# Patient Record
Sex: FEMALE | Race: OTHER | HISPANIC OR LATINO | URBAN - METROPOLITAN AREA
[De-identification: names, ages, dates, MRNs, and addresses within clinical notes are randomized per-mention and may not be internally consistent; named-entity substitution may affect disease eponyms.]

---

## 2018-05-16 ENCOUNTER — OUTPATIENT (OUTPATIENT)
Dept: OUTPATIENT SERVICES | Facility: HOSPITAL | Age: 48
LOS: 1 days | Discharge: HOME | End: 2018-05-16

## 2018-05-16 DIAGNOSIS — Z12.31 ENCOUNTER FOR SCREENING MAMMOGRAM FOR MALIGNANT NEOPLASM OF BREAST: ICD-10-CM

## 2019-07-22 ENCOUNTER — OUTPATIENT (OUTPATIENT)
Dept: OUTPATIENT SERVICES | Facility: HOSPITAL | Age: 49
LOS: 1 days | Discharge: HOME | End: 2019-07-22
Payer: COMMERCIAL

## 2019-07-22 DIAGNOSIS — Z12.31 ENCOUNTER FOR SCREENING MAMMOGRAM FOR MALIGNANT NEOPLASM OF BREAST: ICD-10-CM

## 2019-07-22 PROCEDURE — 77067 SCR MAMMO BI INCL CAD: CPT | Mod: 26

## 2019-07-22 PROCEDURE — 77063 BREAST TOMOSYNTHESIS BI: CPT | Mod: 26

## 2019-07-24 PROBLEM — Z00.00 ENCOUNTER FOR PREVENTIVE HEALTH EXAMINATION: Status: ACTIVE | Noted: 2019-07-24

## 2019-08-01 ENCOUNTER — OUTPATIENT (OUTPATIENT)
Dept: OUTPATIENT SERVICES | Facility: HOSPITAL | Age: 49
LOS: 1 days | Discharge: HOME | End: 2019-08-01

## 2019-08-01 DIAGNOSIS — N97.9 FEMALE INFERTILITY, UNSPECIFIED: ICD-10-CM

## 2020-02-06 ENCOUNTER — INPATIENT (INPATIENT)
Facility: HOSPITAL | Age: 50
LOS: 0 days | Discharge: HOME | End: 2020-02-07
Attending: INTERNAL MEDICINE | Admitting: INTERNAL MEDICINE
Payer: COMMERCIAL

## 2020-02-06 VITALS
RESPIRATION RATE: 18 BRPM | SYSTOLIC BLOOD PRESSURE: 169 MMHG | DIASTOLIC BLOOD PRESSURE: 83 MMHG | HEART RATE: 72 BPM | TEMPERATURE: 98 F | OXYGEN SATURATION: 100 % | WEIGHT: 179.9 LBS

## 2020-02-06 DIAGNOSIS — D25.9 LEIOMYOMA OF UTERUS, UNSPECIFIED: Chronic | ICD-10-CM

## 2020-02-06 DIAGNOSIS — Z90.49 ACQUIRED ABSENCE OF OTHER SPECIFIED PARTS OF DIGESTIVE TRACT: Chronic | ICD-10-CM

## 2020-02-06 LAB
ALBUMIN SERPL ELPH-MCNC: 4.2 G/DL — SIGNIFICANT CHANGE UP (ref 3.5–5.2)
ALP SERPL-CCNC: 85 U/L — SIGNIFICANT CHANGE UP (ref 30–115)
ALT FLD-CCNC: 27 U/L — SIGNIFICANT CHANGE UP (ref 0–41)
ANION GAP SERPL CALC-SCNC: 12 MMOL/L — SIGNIFICANT CHANGE UP (ref 7–14)
APTT BLD: 30.1 SEC — SIGNIFICANT CHANGE UP (ref 27–39.2)
AST SERPL-CCNC: 28 U/L — SIGNIFICANT CHANGE UP (ref 0–41)
BASOPHILS # BLD AUTO: 0.04 K/UL — SIGNIFICANT CHANGE UP (ref 0–0.2)
BASOPHILS NFR BLD AUTO: 0.6 % — SIGNIFICANT CHANGE UP (ref 0–1)
BILIRUB SERPL-MCNC: 0.2 MG/DL — SIGNIFICANT CHANGE UP (ref 0.2–1.2)
BUN SERPL-MCNC: 9 MG/DL — LOW (ref 10–20)
CALCIUM SERPL-MCNC: 9.2 MG/DL — SIGNIFICANT CHANGE UP (ref 8.5–10.1)
CHLORIDE SERPL-SCNC: 108 MMOL/L — SIGNIFICANT CHANGE UP (ref 98–110)
CO2 SERPL-SCNC: 23 MMOL/L — SIGNIFICANT CHANGE UP (ref 17–32)
CREAT SERPL-MCNC: 0.7 MG/DL — SIGNIFICANT CHANGE UP (ref 0.7–1.5)
EOSINOPHIL # BLD AUTO: 0.07 K/UL — SIGNIFICANT CHANGE UP (ref 0–0.7)
EOSINOPHIL NFR BLD AUTO: 1 % — SIGNIFICANT CHANGE UP (ref 0–8)
GLUCOSE SERPL-MCNC: 98 MG/DL — SIGNIFICANT CHANGE UP (ref 70–99)
HCT VFR BLD CALC: 38.7 % — SIGNIFICANT CHANGE UP (ref 37–47)
HGB BLD-MCNC: 11.6 G/DL — LOW (ref 12–16)
IMM GRANULOCYTES NFR BLD AUTO: 0.4 % — HIGH (ref 0.1–0.3)
INR BLD: 1.01 RATIO — SIGNIFICANT CHANGE UP (ref 0.65–1.3)
LYMPHOCYTES # BLD AUTO: 1.92 K/UL — SIGNIFICANT CHANGE UP (ref 1.2–3.4)
LYMPHOCYTES # BLD AUTO: 27.2 % — SIGNIFICANT CHANGE UP (ref 20.5–51.1)
MCHC RBC-ENTMCNC: 23.7 PG — LOW (ref 27–31)
MCHC RBC-ENTMCNC: 30 G/DL — LOW (ref 32–37)
MCV RBC AUTO: 79.1 FL — LOW (ref 81–99)
MONOCYTES # BLD AUTO: 0.77 K/UL — HIGH (ref 0.1–0.6)
MONOCYTES NFR BLD AUTO: 10.9 % — HIGH (ref 1.7–9.3)
NEUTROPHILS # BLD AUTO: 4.24 K/UL — SIGNIFICANT CHANGE UP (ref 1.4–6.5)
NEUTROPHILS NFR BLD AUTO: 59.9 % — SIGNIFICANT CHANGE UP (ref 42.2–75.2)
NRBC # BLD: 0 /100 WBCS — SIGNIFICANT CHANGE UP (ref 0–0)
PLATELET # BLD AUTO: 357 K/UL — SIGNIFICANT CHANGE UP (ref 130–400)
POTASSIUM SERPL-MCNC: 4.6 MMOL/L — SIGNIFICANT CHANGE UP (ref 3.5–5)
POTASSIUM SERPL-SCNC: 4.6 MMOL/L — SIGNIFICANT CHANGE UP (ref 3.5–5)
PROT SERPL-MCNC: 7.1 G/DL — SIGNIFICANT CHANGE UP (ref 6–8)
PROTHROM AB SERPL-ACNC: 11.6 SEC — SIGNIFICANT CHANGE UP (ref 9.95–12.87)
RBC # BLD: 4.89 M/UL — SIGNIFICANT CHANGE UP (ref 4.2–5.4)
RBC # FLD: 18.8 % — HIGH (ref 11.5–14.5)
SODIUM SERPL-SCNC: 143 MMOL/L — SIGNIFICANT CHANGE UP (ref 135–146)
WBC # BLD: 7.07 K/UL — SIGNIFICANT CHANGE UP (ref 4.8–10.8)
WBC # FLD AUTO: 7.07 K/UL — SIGNIFICANT CHANGE UP (ref 4.8–10.8)

## 2020-02-06 PROCEDURE — 70450 CT HEAD/BRAIN W/O DYE: CPT | Mod: 26

## 2020-02-06 PROCEDURE — 70551 MRI BRAIN STEM W/O DYE: CPT | Mod: 26

## 2020-02-06 PROCEDURE — 0042T: CPT

## 2020-02-06 PROCEDURE — 93010 ELECTROCARDIOGRAM REPORT: CPT

## 2020-02-06 PROCEDURE — 99221 1ST HOSP IP/OBS SF/LOW 40: CPT | Mod: AI,GC

## 2020-02-06 PROCEDURE — 99285 EMERGENCY DEPT VISIT HI MDM: CPT

## 2020-02-06 RX ORDER — ENOXAPARIN SODIUM 100 MG/ML
40 INJECTION SUBCUTANEOUS DAILY
Refills: 0 | Status: DISCONTINUED | OUTPATIENT
Start: 2020-02-06 | End: 2020-02-07

## 2020-02-06 RX ORDER — ASPIRIN/CALCIUM CARB/MAGNESIUM 324 MG
81 TABLET ORAL DAILY
Refills: 0 | Status: DISCONTINUED | OUTPATIENT
Start: 2020-02-07 | End: 2020-02-07

## 2020-02-06 RX ORDER — CHLORHEXIDINE GLUCONATE 213 G/1000ML
1 SOLUTION TOPICAL
Refills: 0 | Status: DISCONTINUED | OUTPATIENT
Start: 2020-02-06 | End: 2020-02-07

## 2020-02-06 RX ORDER — ATORVASTATIN CALCIUM 80 MG/1
80 TABLET, FILM COATED ORAL AT BEDTIME
Refills: 0 | Status: DISCONTINUED | OUTPATIENT
Start: 2020-02-06 | End: 2020-02-07

## 2020-02-06 RX ORDER — LEVOTHYROXINE SODIUM 125 MCG
1 TABLET ORAL
Qty: 0 | Refills: 0 | DISCHARGE

## 2020-02-06 RX ORDER — ASPIRIN/CALCIUM CARB/MAGNESIUM 324 MG
325 TABLET ORAL ONCE
Refills: 0 | Status: COMPLETED | OUTPATIENT
Start: 2020-02-06 | End: 2020-02-06

## 2020-02-06 RX ORDER — LEVOTHYROXINE SODIUM 125 MCG
75 TABLET ORAL DAILY
Refills: 0 | Status: DISCONTINUED | OUTPATIENT
Start: 2020-02-06 | End: 2020-02-07

## 2020-02-06 RX ADMIN — ATORVASTATIN CALCIUM 80 MILLIGRAM(S): 80 TABLET, FILM COATED ORAL at 19:57

## 2020-02-06 RX ADMIN — Medication 325 MILLIGRAM(S): at 19:56

## 2020-02-06 NOTE — H&P ADULT - HISTORY OF PRESENT ILLNESS
49F PMHx of hypothyroidism, TIA? migraines? presented to ED complaining of a headache. Reports sudden onset h/a the day prior to presentation in occipital region, 7/10 in severity currently, 10/10 at its worst. Since onset reports she has developed LUE + L jun-oral numbness/tingling. Also reports significant dizziness and imbalance, denies falling. No blurry vision, slurred speech, facial droop, other focal deficit, chest pain, palpitations, SOB, fevers, chills, N/V/C/D, dysuria, or other complaint or symptom. Patient reports she had a similar HA + L-sided numbness/tingling few years ago at which time she was admitted to [facility] and worked up for a stroke. MRI was negative (reviewed), carotid duplex showed 20-40% b/l stenosis, was told she had a TIA and/or painless migraines. Has been asymptomatic since then aside from intermittent HA's every few years. Reports +ve family history of stroke with multiple members affected 49F PMHx of hypothyroidism, TIA? migraines? presented to ED complaining of a headache. Reports sudden onset h/a the day prior to presentation in occipital region, 7/10 in severity currently, 10/10 at its worst. Since onset reports she has developed LUE + L jun-oral numbness/tingling. Also reports significant dizziness and imbalance, denies falling. No blurry vision, slurred speech, facial droop, other focal deficit, chest pain, palpitations, SOB, fevers, chills, N/V/C/D, dysuria, or other complaint or symptom. Patient reports she had a similar HA + L-sided numbness/tingling few years ago at which time she was admitted to [facility] and worked up for a stroke. MRI was negative (reviewed), carotid duplex showed 20-40% b/l stenosis, was told she had a TIA and/or painless migraines took baby Asa for 1-2 yrs at time. Has been asymptomatic since then aside from intermittent HA's every few years. Reports +ve family history of stroke with multiple members affected

## 2020-02-06 NOTE — ED PROVIDER NOTE - OBJECTIVE STATEMENT
48 yo F with PMH of hypothyroidism, TIA? migraines? presented to ED complaining of a headache. Patient reports it started day prior to presentation. Started all of a sudden, located in occipital region, 7/10 in severity currently, 10/10 at its worst. Since onset reports she has developed LUE + L jun-oral numbness/tingling. Also reports significant dizziness and imbalance, denies falling. No blurry vision, slurred speech, facial droop, other focal deficit, chest pain, palpitations, SOB, fevers, chills, N/V/C/D, dysuria, or other complaint or symptom. Patient reports she had a similar HA + L-sided numbness/tingling few years ago at which time she was admitted to [facility] and worked up for a stroke. MRI was negative (reviewed), carotid duplex showed 20-40% b/l stenosis, was told she had a TIA and/or painless migraines. Has been asymptomatic since then aside from intermittent HA's every few years. Reports +ve family history of stroke with multiple members affected.

## 2020-02-06 NOTE — ED ADULT NURSE NOTE - OBJECTIVE STATEMENT
Head pressure to top and back of head, started yesterday. Patient feels dizzy and lightheaded as well. Patient also c/o left arm and lip numbness and tingling.

## 2020-02-06 NOTE — ED ADULT TRIAGE NOTE - AS TEMP SITE
Informed that the referral  On file was faxed   
oral

## 2020-02-06 NOTE — H&P ADULT - NSHPLABSRESULTS_GEN_ALL_CORE
11.6   7.07  )-----------( 357      ( 06 Feb 2020 15:22 )             38.7   02-06    143  |  108  |  9<L>  ----------------------------<  98  4.6   |  23  |  0.7    Ca    9.2      06 Feb 2020 15:22    TPro  7.1  /  Alb  4.2  /  TBili  0.2  /  DBili  x   /  AST  28  /  ALT  27  /  AlkPhos  85  02-06    PT/INR - ( 06 Feb 2020 15:22 )   PT: 11.60 sec;   INR: 1.01 ratio         PTT - ( 06 Feb 2020 15:22 )  PTT:30.1 sec    < from: CT Perfusion w/ Maps w/ IV Cont (02.06.20 @ 17:12) >      IMPRESSION:  No large vessel occlusion, focal perfusion deficit or surrounding ischemic penumbra.        < end of copied text >

## 2020-02-06 NOTE — H&P ADULT - NSICDXFAMILYHX_GEN_ALL_CORE_FT
FAMILY HISTORY:  Sibling  Still living? Unknown  Family history of stroke, Age at diagnosis: Age Unknown

## 2020-02-06 NOTE — H&P ADULT - NSICDXPASTMEDICALHX_GEN_ALL_CORE_FT
PAST MEDICAL HISTORY:  Hypothyroid     Migraines patient states she was diagnosed wiith "painless migraines" that can cause  TIA S/S

## 2020-02-06 NOTE — H&P ADULT - NSHPPHYSICALEXAM_GEN_ALL_CORE
Vital Signs Last 24 Hrs  T(F): 98.2 (06 Feb 2020 16:16), Max: 98.2 (06 Feb 2020 16:16)  HR: 73 (06 Feb 2020 16:16) (72 - 73)  BP: 137/66 (06 Feb 2020 16:16) (137/66 - 169/83)  RR: 18 (06 Feb 2020 16:16) (18 - 18)  SpO2: 98% (06 Feb 2020 16:16) (98% - 100%)    PHYSICAL EXAM:  GENERAL: The patient is a well-developed, well-nourished in no apparent distress. AOX3.  HEENT: NCAT   NEURO: no focal deficits. 5/5 upper and lower motor strenght. CNII-XII intact. unsteady gait.   NECK: Supple. No lymphadenopathy or thyromegaly.  LUNGS: No respiratory distress or accessory muscle use. CTAB  HEART: RRR, S1 S2 Audible  ABDOMEN: Soft, nontender, and nondistended.  No gaurding or rigidity.  No hepatosplenomegaly was noted.  EXTREMITIES: Without any cyanosis, clubbing, rash, lesions or edema.

## 2020-02-06 NOTE — PROGRESS NOTE ADULT - SUBJECTIVE AND OBJECTIVE BOX
Neurology Consult      HPI:  49F PMHx of hypothyroidism, TIA? migraines? presented to ED complaining of an occipital headache. Reports sudden onset h/a the day prior to presentation in occipital region, 7/10 in severity currently, 10/10 at its worst. Since onset reports she has developed LUE + L jun-oral numbness/tingling. Also reports significant dizziness and imbalance, denies falling. On exam has left sensory deficit to arm and leg as well as weakness to LUE and LLE. NIHSS 3. No nystagmus.          Relevant PMH:  [x] Prior ischemic stroke/TIA  [] Afib  []CAD  []HTN  []DLD  []DM []PVD []Obesity [] Sedintary lifestyle []CHF  []CANDACE  []Cancer Hx     Social History: [] Smoking []  Drug Use: []   Alcohol Use:   [] Other:      Possible Location of Stroke:  Unknown at this time, will have a better understanding post stroke workup.  Possible Cause of Stroke:  Unknown at this time, will have a better understanding post stroke workup.  Relevant Cerebral Imaging:  pending  Relevant Cervicocerebral Imaging:      CT Perfusion w/ Maps w/ IV Cont:         IMPRESSION:  No large vessel occlusion, focal perfusion deficit or surrounding ischemic penumbra.        Relevant blood tests:    pending  Relevant cardiac rhythm monitoring:  pending  Relevant Cardiac Structure:(TTE/JODI +/-):[]No intracardiac thrombus/[] no vegetation/[]no [place]ia/EF:  pending    Home Medications:  Synthroid 75 mcg (0.075 mg) oral tablet: 1 tab(s) orally once a day (06 Feb 2020 14:54)      MEDICATIONS  (STANDING):  aspirin 325 milliGRAM(s) Oral once  atorvastatin 80 milliGRAM(s) Oral at bedtime  chlorhexidine 4% Liquid 1 Application(s) Topical <User Schedule>  enoxaparin Injectable 40 milliGRAM(s) SubCutaneous daily  levothyroxine 75 MICROGram(s) Oral daily      PT/OT/Speech/Rehab/S&Swr: pending    Exam:    Vital Signs Last 24 Hrs  T(C): 36.8 (06 Feb 2020 16:16), Max: 36.8 (06 Feb 2020 16:16)  T(F): 98.2 (06 Feb 2020 16:16), Max: 98.2 (06 Feb 2020 16:16)  HR: 73 (06 Feb 2020 16:16) (72 - 73)  BP: 137/66 (06 Feb 2020 16:16) (137/66 - 169/83)  BP(mean): --  RR: 18 (06 Feb 2020 16:16) (18 - 18)  SpO2: 98% (06 Feb 2020 16:16) (98% - 100%)    NIHSS    Examination:  General:  Appearance is consistent with chronologic age.  No abnormal facies.  Gross skin survey within normal limits.    Cognitive/Language:  The patient is oriented to person, place, time and date.  Recent and remote memory intact.  Fund of knowledge is intact and normal.  Language with normal repetition, comprehension and naming.  Nondysarthric.    Eyes: intact VA, VFF.  EOMI w/o nystagmus, skew or reported double vision.  PERRL.  No ptosis/weakness of eyelid closure.    Face:  Facial sensation normal V1 - 3, no facial asymmetry.    Ears/Nose/Throat:  Hearing grossly intact b/l.  Palate elevates midline.  Tongue and uvula midline.   Motor examination:   Normal tone, bulk and range of motion.  No tenderness, twitching, tremors or involuntary movements.  Formal Muscle Strength Testing: (MRC grade R/L) RUE 5/5 LUE 4+/5  RLE 5/5 LLE 4+/5.  No observable drift, when flexion and extension of LUE LLE tested weakness present.  Sensory examination:   on right UE LE Intact to light touch, sensory deficit on LUE LLE   Cerebellum:   FTN/HKS intact with normal ELIA in all limbs.  No dysmetria or dysdiadokinesia.  Gait: walking induces dizziness.     LOC:       1a:  0   1b(Questions):      0     1c(Instructions):     0        Best Gaze:0  Visual:0  Motor:                 RUE:   0  RLE: 0    LUE:1     LLE:   1  FACE: 0    Limb Ataxia:0  Sensory:     1  Language:     0  Dysarthria:        0  Extinction and Inattention:0    NIHSS on admission:    3       m-RS:1

## 2020-02-06 NOTE — PROGRESS NOTE ADULT - ATTENDING COMMENTS
Pt examined this morning and had improved strength, mild left facial numbness.  MRI brain has returned negative for acute stroke.  CT Perfusion negative for LVO.  Echo negative  EKG NSR    Impression:  Likely migraine variant - aura w/o headache.    Recommend:  1.  Magnesium 400 mg/day for headache prevention.  2.  Hydrate well and avoid caffeine.  3.  May take aspirin 81 mg daily until f/u neurologic appointment is obtained.

## 2020-02-06 NOTE — ED PROVIDER NOTE - NS ED ROS FT
REVIEW OF SYSTEMS:    CONSTITUTIONAL: No weakness, fevers or chills  EYES/ENT: No visual changes;  No vertigo or throat pain   NECK: No pain or stiffness  RESPIRATORY: No cough, wheezing, hemoptysis; No shortness of breath  CARDIOVASCULAR: No chest pain or palpitations  GASTROINTESTINAL: No abdominal or epigastric pain. No nausea, vomiting, or hematemesis; No diarrhea or constipation. No melena or hematochezia.  GENITOURINARY: No dysuria, frequency or hematuria  NEUROLOGICAL: see HPI  SKIN: No itching, rashes

## 2020-02-06 NOTE — ED PROVIDER NOTE - PROGRESS NOTE DETAILS
evaluated by Neuro at bedside - Rx CT perfusion and admit to stroke unit. Attending Note: 48 y/o F with pertinent PMH of TIA presenting for HA, dizziness, periorbital and L-hand tingling since yesterday. Due to exam and pts pertinent PMH, ruben was consulted, was seen by neurologist; admit to stroke unit. Agree with exam above.

## 2020-02-06 NOTE — ED ADULT TRIAGE NOTE - CHIEF COMPLAINT QUOTE
Pt c/o headache and dizziness since yesterday. Pt states she got dx with migraines. Neuro exam intact.

## 2020-02-06 NOTE — H&P ADULT - ASSESSMENT
A/P: 49 F PMHx hypothyroidism migraines TIA? presents with dizziness unstable gait and LUE and L perioral numbness    # dizziness unstable gait and LUE and L perioral numbness; R/o CVA  - Stroke unit, q4 neuro checks  - Send HbA1c, Lipid panel, 2d echo, tele, Mr head. f/u Ct scan final reads. Start Asa 325 and 81 in AM. Lipitor 80  - PT/OT/speech and dysphagia screen  - f/u neuro rec's     # hypothyroidism - resume home synthroid  # DVT PPx   # Activity -  Increase as Tolerated Fall risk / PT/OT  # Dispo -   Patient to be discharged when medically optimized.  # Code Status - (X) FULL A/P: 49 F PMHx hypothyroidism migraines TIA? presents with dizziness unstable gait and LUE and L perioral numbness    # dizziness unstable gait and LUE and L perioral numbness; R/o CVA  - Stroke unit, q4 neuro checks  - Send HbA1c, Lipid panel, 2d echo, tele, Mr head. Start Asa 325 and 81 in AM. Lipitor 80  - PT/OT/speech and dysphagia screen  - f/u neuro rec's     # Hypothyroidism - resume home synthroid  # DVT PPx   # Activity -  Increase as Tolerated Fall risk / PT/OT  # Dispo -   Patient to be discharged when medically optimized.  # Code Status - (X) FULL

## 2020-02-06 NOTE — PROGRESS NOTE ADULT - ASSESSMENT
Impression:  49F PMHx of hypothyroidism, TIA? migraines? presented to ED complaining of an occipital headache. Reports sudden onset h/a the day prior to presentation in occipital region, 7/10 in severity currently, 10/10 at its worst. Since onset reports she has developed LUE + L jun-oral numbness/tingling. Also reports significant dizziness and imbalance, denies falling. On exam has left sensory deficit to arm and leg as well as weakness to LUE and LLE. NIHSS 3. No nystagmus. Etiology of symptoms unknown will ahve a better understanding post stroke workup.    Suggestion:  Discussed with radiology, will read CT head now.   If no hemorrhage can continue ASA.   Continue Statin.  MRI brain without shira.  TTE.  LDL, A1C  Telemetry monitoring.   PT OT Rehab      Admit to stroke unit    Raghav Redding NP  [phone]

## 2020-02-06 NOTE — H&P ADULT - ATTENDING COMMENTS
A 48 yo female with PMH of Hypothyroidism and Migraine came to ED for headache and left arm numbness. Symptoms started yesterday with severe occipital headache she also felt numbness and mild weakness in left arm, she reports dizziness and unsteady gait. No blurry vision, no other complaints.  In the ED /83, HR 72, CT head and CT perfusion were negative for acute stroke.     PHYSICAL EXAM:  GENERAL: NAD, well-developed  HEAD:  Atraumatic, Normocephalic  EYES: EOMI, PERRLA, conjunctiva and sclera clear  NECK: Supple, No JVD  CHEST/LUNG: Clear to auscultation bilaterally; No wheeze  HEART: Regular rate and rhythm; No murmurs, rubs, or gallops  ABDOMEN: Soft, Nontender, Nondistended; Bowel sounds present  EXTREMITIES:  2+ Peripheral Pulses, No clubbing, cyanosis, or edema  PSYCH: AAOx3  NEUROLOGY: CN II-XII non focal, Strength: LUE 4.5/5 distal, sensation decreased for touch in LUE.   SKIN: No rashes or lesions    A/P:   Headache and LUE numbness:   Possible complicated Migraine.   Acute CVA ruled out, Brain MRI is negative.   Headache improved.   Pain control with Tylenol, Motrin.   Neurology follow up.   Further work up to rule out radiculoneuropathy: cervical MRI and EMG can be done outpatient

## 2020-02-06 NOTE — ED ADULT NURSE NOTE - PMH
Hypothyroid    Migraines  patient states she was diagnosed wiith "painless migraines" that can cause  TIA S/S

## 2020-02-07 ENCOUNTER — TRANSCRIPTION ENCOUNTER (OUTPATIENT)
Age: 50
End: 2020-02-07

## 2020-02-07 VITALS
DIASTOLIC BLOOD PRESSURE: 66 MMHG | OXYGEN SATURATION: 99 % | SYSTOLIC BLOOD PRESSURE: 119 MMHG | HEART RATE: 88 BPM | RESPIRATION RATE: 18 BRPM | TEMPERATURE: 98 F

## 2020-02-07 LAB
ANION GAP SERPL CALC-SCNC: 11 MMOL/L — SIGNIFICANT CHANGE UP (ref 7–14)
BUN SERPL-MCNC: 13 MG/DL — SIGNIFICANT CHANGE UP (ref 10–20)
CALCIUM SERPL-MCNC: 9.2 MG/DL — SIGNIFICANT CHANGE UP (ref 8.5–10.1)
CHLORIDE SERPL-SCNC: 104 MMOL/L — SIGNIFICANT CHANGE UP (ref 98–110)
CHOLEST SERPL-MCNC: 214 MG/DL — HIGH (ref 100–200)
CO2 SERPL-SCNC: 26 MMOL/L — SIGNIFICANT CHANGE UP (ref 17–32)
CREAT SERPL-MCNC: 0.6 MG/DL — LOW (ref 0.7–1.5)
ESTIMATED AVERAGE GLUCOSE: 123 MG/DL — HIGH (ref 68–114)
GLUCOSE SERPL-MCNC: 89 MG/DL — SIGNIFICANT CHANGE UP (ref 70–99)
HBA1C BLD-MCNC: 5.9 % — HIGH (ref 4–5.6)
HDLC SERPL-MCNC: 35 MG/DL — LOW
LIPID PNL WITH DIRECT LDL SERPL: 168 MG/DL — HIGH (ref 4–129)
POTASSIUM SERPL-MCNC: 5 MMOL/L — SIGNIFICANT CHANGE UP (ref 3.5–5)
POTASSIUM SERPL-SCNC: 5 MMOL/L — SIGNIFICANT CHANGE UP (ref 3.5–5)
SODIUM SERPL-SCNC: 141 MMOL/L — SIGNIFICANT CHANGE UP (ref 135–146)
TOTAL CHOLESTEROL/HDL RATIO MEASUREMENT: 6.1 RATIO — HIGH (ref 4–5.5)
TRIGL SERPL-MCNC: 107 MG/DL — SIGNIFICANT CHANGE UP (ref 10–149)

## 2020-02-07 PROCEDURE — 99222 1ST HOSP IP/OBS MODERATE 55: CPT | Mod: AI

## 2020-02-07 PROCEDURE — 99283 EMERGENCY DEPT VISIT LOW MDM: CPT

## 2020-02-07 PROCEDURE — 93306 TTE W/DOPPLER COMPLETE: CPT | Mod: 26

## 2020-02-07 RX ORDER — INFLUENZA VIRUS VACCINE 15; 15; 15; 15 UG/.5ML; UG/.5ML; UG/.5ML; UG/.5ML
0.5 SUSPENSION INTRAMUSCULAR ONCE
Refills: 0 | Status: DISCONTINUED | OUTPATIENT
Start: 2020-02-07 | End: 2020-02-07

## 2020-02-07 RX ORDER — ASPIRIN/CALCIUM CARB/MAGNESIUM 324 MG
1 TABLET ORAL
Qty: 30 | Refills: 0
Start: 2020-02-07 | End: 2020-03-07

## 2020-02-07 RX ORDER — ATORVASTATIN CALCIUM 80 MG/1
1 TABLET, FILM COATED ORAL
Qty: 30 | Refills: 0
Start: 2020-02-07 | End: 2020-03-07

## 2020-02-07 RX ADMIN — ENOXAPARIN SODIUM 40 MILLIGRAM(S): 100 INJECTION SUBCUTANEOUS at 11:58

## 2020-02-07 RX ADMIN — Medication 81 MILLIGRAM(S): at 11:57

## 2020-02-07 RX ADMIN — Medication 75 MICROGRAM(S): at 05:37

## 2020-02-07 NOTE — OCCUPATIONAL THERAPY INITIAL EVALUATION ADULT - GENERAL OBSERVATIONS, REHAB EVAL
Pt seen 8:30-8:57 pt received semi duncan in bed in NAD +tele +family present. Pt agreeable to OT lauren.

## 2020-02-07 NOTE — PROGRESS NOTE ADULT - ASSESSMENT
A/P: 49 F PMHx hypothyroidism migraines TIA? presents with dizziness unstable gait and LUE and L perioral numbness    # dizziness unstable gait and LUE and L perioral numbness; R/o CVA  - Stroke unit, q4 neuro checks  -CT head and MR head negative  - PT/OT/speech and dysphagia screen  - f/u neuro rec's     # Hypothyroidism - resume home synthroid  # DVT PPx   # Activity -  Increase as Tolerated Fall risk / PT/OT  # Dispo -   Patient to be discharged when medically optimized.  # Code Status - (X) FULL

## 2020-02-07 NOTE — OCCUPATIONAL THERAPY INITIAL EVALUATION ADULT - VISUAL ASSESSMENT: TRACKING
WFL, Pt closes eyes when tracking in upward diagonal directions in both fields and reports heaviness.

## 2020-02-07 NOTE — DISCHARGE NOTE PROVIDER - NSDCCPCAREPLAN_GEN_ALL_CORE_FT
PRINCIPAL DISCHARGE DIAGNOSIS  Diagnosis: Dizziness  Assessment and Plan of Treatment: Possible secondary to neuropathy vs TIA . Needs to f/u with neuro as out pt.

## 2020-02-07 NOTE — DISCHARGE NOTE NURSING/CASE MANAGEMENT/SOCIAL WORK - PATIENT PORTAL LINK FT
You can access the FollowMyHealth Patient Portal offered by [facility] by registering at the following website: [email or web address]. By joining [employer]’s FollowMyHealth portal, you will also be able to view your health information using other applications (apps) compatible with our system.

## 2020-02-07 NOTE — DISCHARGE NOTE PROVIDER - CARE PROVIDER_API CALL
Nithin Antonio)  Neurology  [address] [place] [address], [address]  [place] [address]  Phone: [phone]  Fax: [phone]  Follow Up Time:

## 2020-02-07 NOTE — PROGRESS NOTE ADULT - SUBJECTIVE AND OBJECTIVE BOX
SUBJECTIVE:    Patient is a 49y old Female who presents with a chief complaint of r/o CVA (06 Feb 2020 18:43)    Currently admitted to medicine with the primary diagnosis of Dizziness     Today is hospital day 1d. This morning she is resting comfortably in bed and reports no new issues or overnight events.     PAST MEDICAL & SURGICAL HISTORY  Migraines: patient states she was diagnosed wiith &quot;painless migraines&quot; that can cause  TIA S/S  Hypothyroid  History of cholecystectomy  Fibroid, uterine    SOCIAL HISTORY:  Negative for smoking/alcohol/drug use.     ALLERGIES:  codeine (Unknown)    MEDICATIONS:  STANDING MEDICATIONS  aspirin enteric coated 81 milliGRAM(s) Oral daily  atorvastatin 80 milliGRAM(s) Oral at bedtime  chlorhexidine 4% Liquid 1 Application(s) Topical <User Schedule>  enoxaparin Injectable 40 milliGRAM(s) SubCutaneous daily  levothyroxine 75 MICROGram(s) Oral daily    PRN MEDICATIONS    VITALS:   T(F): 97.5  HR: 62  BP: 116/55  RR: 18  SpO2: 98%    PHYSICAL EXAM:  GEN: No acute distress  LUNGS: Clear to auscultation bilaterally   HEART: S1/S2 present.    ABD: Soft, non-tender, non-distended. Bowel sounds present  NEURO: AAOX3      LABS:                        11.6   7.07  )-----------( 357      ( 06 Feb 2020 15:22 )             38.7     02-07    141  |  104  |  13  ----------------------------<  89  5.0   |  26  |  0.6<L>    Ca    9.2      07 Feb 2020 07:27    TPro  7.1  /  Alb  4.2  /  TBili  0.2  /  DBili  x   /  AST  28  /  ALT  27  /  AlkPhos  85  02-06    PT/INR - ( 06 Feb 2020 15:22 )   PT: 11.60 sec;   INR: 1.01 ratio         PTT - ( 06 Feb 2020 15:22 )  PTT:30.1 sec                  RADIOLOGY:

## 2020-02-07 NOTE — DISCHARGE NOTE PROVIDER - HOSPITAL COURSE
49F PMHx of hypothyroidism, TIA? migraines? presented to ED complaining of a headache. Reports sudden onset h/a the day prior to presentation in occipital region, 7/10 in severity currently, 10/10 at its worst. Since onset reports she has developed LUE + L jun-oral numbness/tingling. Also reports significant dizziness and imbalance, denies falling. No blurry vision, slurred speech, facial droop, other focal deficit, chest pain, palpitations, SOB, fevers, chills, N/V/C/D, dysuria, or other complaint or symptom. Patient reports she had a similar HA + L-sided numbness/tingling few years ago at which time she was admitted to [facility] and worked up for a stroke. MRI was negative (reviewed), carotid duplex showed 20-40% b/l stenosis, was told she had a TIA and/or painless migraines took baby Asa for 1-2 yrs at time. Has been asymptomatic since then aside from intermittent HA's every few years. Reports +ve family history of stroke with multiple members affected                        49 F PMHx hypothyroidism migraines TIA? presents with dizziness unstable gait and LUE and L perioral numbness        # dizziness unstable gait and LUE and L perioral numbness; R/o CVA    - Stroke unit, q4 neuro checks    -CT head and MR head negative    - PT/OT/speech and dysphagia screen    - f/u neuro rec's         # Hypothyroidism - resume home synthroid    # DVT PPx     # Activity -  Increase as Tolerated Fall risk / PT/OT

## 2020-02-07 NOTE — OCCUPATIONAL THERAPY INITIAL EVALUATION ADULT - PERTINENT HX OF CURRENT PROBLEM, REHAB EVAL
Pt is a right handed 50 y/o woman admitted to ER with occipital headache, dizziness and LUE and LLE weakness and impaired sensation.

## 2020-02-07 NOTE — PHYSICAL THERAPY INITIAL EVALUATION ADULT - ASR WT BEARING STATUS EVAL
Bed: 02  Expected date:   Expected time:   Means of arrival:   Comments:  EMS 76M Abd pain missed dialysis 190/50 70 16  no weight-bearing restrictions

## 2020-02-07 NOTE — DISCHARGE NOTE PROVIDER - NSDCMRMEDTOKEN_GEN_ALL_CORE_FT
aspirin 81 mg oral delayed release tablet: 1 tab(s) orally once a day  atorvastatin 80 mg oral tablet: 1 tab(s) orally once a day (at bedtime)  Synthroid 75 mcg (0.075 mg) oral tablet: 1 tab(s) orally once a day

## 2020-02-07 NOTE — PHYSICAL THERAPY INITIAL EVALUATION ADULT - SIT-TO-STAND BALANCE
HOME MANAGEMENT OF VOMITING AND DIARRHEA      At this visit, your pediatrician has determined that your child's vomiting and/or diarrhea can safely be managed at home.  Most vomiting and diarrhea result from viral infections which resolve in a few days.  Sometimes small children will have vomiting or diarrhea from infections elsewhere in the body.  Also an illness that initially appears to be mild may become more severe or fail to resolve as expected.  We hope that this handout will help you to treat the illness effectively and to recognize signs that point to dangers that may need additional help from our staff.    HOW TO TREAT:  Clear fluids should be used when vomiting is present.  We usually recommend waiting about an hour after the last emesis (episode of vomiting) before starting. Rehydration liquids similar to Pedialyte (R) are important for children less than one year of age, but are not well accepted by older children.  Examples of clear liquids are given below. They work best when given lukewarm and in small (one to three tsp.) amounts. These small amounts can be given as often as every 5-10 minutes. When the child has kept these amounts of fluids down you can increase the amount and offer  them further apart.  Children who have diarrhea without vomiting will actually get well faster if fed as usual but avoid fruit juices and gas producing fruits like prunes, plums, apricots, peaches and pears.    ·      CHILDREN OVER ONE YEAR:  ·   \"flat\" soda (non-diet soda that has had water added or has been left sitting out so that it is less fizzy.  · Dilute sweetened tea  · Gatorade or  water and a little broth soup    GENERAL PRINCIPLES:   · NO FRUIT JUICE   · NO CONCENTRATED SUGAR  · NO COLD LIQUIDS  · NO GASSY LIQUIDS        No milk     AFTER 24 HOURS THE DIET CAN BE ADVANCED TO STARCHY FOODS (In children who ordinarily eat these foods)    · CRACKERS                  · TOAST  · NOODLES    · PRETZELS  · potatoes  · eggs, hard cheese ,  yogurt,chicken    · BLAND FRUITS LIKE APPLESAUCE OR BANANAS  · WHEN YOUR CHILD IS HUNGRY, GIVE ANY FOOD THAT IS LOW IN FAT  · SOME INFANTS ON FORMULA MAY BENEFIT FROM 1-2 WEEKS OF SOY FORMULA IF THEY ARE GASSY WHEN MILK FORMULA IS RESUMED    DO NOT GIVE OVER THE COUNTER MEDICATIONS UNLESS RECOMMENDED BY YOUR PEDIATRICIAN.  THEY MAY PROLONG THE ILLNESS    CALL THE OFFICE IF:  · The child's temperature is over 102 degrees  · The child's stomach is bloated  · Blood or mucus appears in the stool  · The child has less than 3 urinations in a 24 hour period  · Diarrhea has lasted more than 2 weeks  · Vomiting has lasted longer than 2 days  · The child has sunken eyes or a dry mouth  · The child vomits clear liquids consistently  · YOU ARE WORRIED, EVEN IF YOU ARE NOT SURE WHAT IS SCARING YOU      [facility] [place] [phone]  [place] [phone]  [facility]ly [facility], later [facility]  [phone]  [facility]  [phone]  [facility] [phone]  [facility]  [phone]  [facility] [phone]  [facility] [phone]  [facility]  [phone]  AFTER HOURS  [phone]  OUT OF AREA [phone]     normal balance

## 2020-02-11 DIAGNOSIS — G43.109 MIGRAINE WITH AURA, NOT INTRACTABLE, WITHOUT STATUS MIGRAINOSUS: ICD-10-CM

## 2020-02-11 DIAGNOSIS — Z86.73 PERSONAL HISTORY OF TRANSIENT ISCHEMIC ATTACK (TIA), AND CEREBRAL INFARCTION WITHOUT RESIDUAL DEFICITS: ICD-10-CM

## 2020-02-11 DIAGNOSIS — R42 DIZZINESS AND GIDDINESS: ICD-10-CM

## 2020-02-11 DIAGNOSIS — E03.9 HYPOTHYROIDISM, UNSPECIFIED: ICD-10-CM

## 2020-02-11 DIAGNOSIS — R26.81 UNSTEADINESS ON FEET: ICD-10-CM

## 2020-02-12 PROBLEM — E03.9 HYPOTHYROIDISM, UNSPECIFIED: Chronic | Status: ACTIVE | Noted: 2020-02-06

## 2020-02-12 PROBLEM — G43.909 MIGRAINE, UNSPECIFIED, NOT INTRACTABLE, WITHOUT STATUS MIGRAINOSUS: Chronic | Status: ACTIVE | Noted: 2020-02-06

## 2020-02-20 ENCOUNTER — APPOINTMENT (OUTPATIENT)
Dept: NEUROLOGY | Facility: CLINIC | Age: 50
End: 2020-02-20
Payer: COMMERCIAL

## 2020-02-20 VITALS
TEMPERATURE: 99 F | HEIGHT: 68 IN | DIASTOLIC BLOOD PRESSURE: 84 MMHG | SYSTOLIC BLOOD PRESSURE: 148 MMHG | HEART RATE: 65 BPM | WEIGHT: 180 LBS | BODY MASS INDEX: 27.28 KG/M2 | OXYGEN SATURATION: 99 %

## 2020-02-20 DIAGNOSIS — Z82.3 FAMILY HISTORY OF STROKE: ICD-10-CM

## 2020-02-20 DIAGNOSIS — Z78.9 OTHER SPECIFIED HEALTH STATUS: ICD-10-CM

## 2020-02-20 DIAGNOSIS — Z86.39 PERSONAL HISTORY OF OTHER ENDOCRINE, NUTRITIONAL AND METABOLIC DISEASE: ICD-10-CM

## 2020-02-20 DIAGNOSIS — G43.909 MIGRAINE, UNSPECIFIED, NOT INTRACTABLE, W/OUT STATUS MIGRAINOSUS: ICD-10-CM

## 2020-02-20 PROCEDURE — 99213 OFFICE O/P EST LOW 20 MIN: CPT

## 2020-02-20 RX ORDER — LEVOTHYROXINE SODIUM 75 UG/1
75 TABLET ORAL
Refills: 0 | Status: ACTIVE | COMMUNITY

## 2020-02-20 RX ORDER — ASPIRIN 81 MG
81 TABLET, DELAYED RELEASE (ENTERIC COATED) ORAL
Refills: 0 | Status: ACTIVE | COMMUNITY

## 2020-02-20 RX ORDER — ATORVASTATIN CALCIUM 80 MG/1
TABLET, FILM COATED ORAL
Refills: 0 | Status: ACTIVE | COMMUNITY

## 2020-02-20 NOTE — HISTORY OF PRESENT ILLNESS
[FreeTextEntry1] : 48 y/o woman with PMH of hypothyroidism, TIA vs complicated migraines? presented to ED complaining of an occipital headache. Reports sudden onset h/a the day prior to presentation in occipital region. After onset reported she had developed LUE + L jun-oral numbness/tingling. Also reported significant dizziness and imbalance. On exam she had left sensory deficit to arm and leg as well as weakness to LUE and LLE. \par \par Her CTA/P H+N was normal\par Her MRI brain was normal\par \par A1C 5.9\par EKG NSR\par \par She was diagnosed w dx of complicated migraines. She is at her baseline currently.\par She does not get these headaches frequently.

## 2020-02-20 NOTE — ASSESSMENT
[FreeTextEntry1] : 50 y/o woman s/p ED visit for complicated migraine. She is now asymptomatic.Stroke eval was negative but she does have multiple stroke risk factors and primary stroke prevention should be continued.\par \par Plan:\par -ASA 81mg QD\par -Continue statin-f/u w/ PMD for dose adjustments\par -Address A1C w PMD\par -Ibuprofen, Magnesium PRN headache\par -ER for new stroke s/s\par -F/u 6m

## 2020-02-20 NOTE — PHYSICAL EXAM
[FreeTextEntry1] : AAOX3\par PERRL, EOMI\par Face symmetrical\par Normal visual fields\par 5/5 strength b/l UE, LE proximally and distally\par Sensory intact to LT, temp\par 2+ DTR's b/l tricep, bicep, BR, patellar, ankle jerk\par No ataxia\par Normal gait\par Downgoing plantars\par

## 2020-02-20 NOTE — REVIEW OF SYSTEMS
[Fever] : no fever [Chills] : no chills [Eyesight Problems] : no eyesight problems [Chest Pain] : no chest pain [Palpitations] : no palpitations [Shortness Of Breath] : no shortness of breath [Skin Lesions] : no skin lesions

## 2020-04-02 ENCOUNTER — APPOINTMENT (OUTPATIENT)
Dept: NEUROLOGY | Facility: CLINIC | Age: 50
End: 2020-04-02

## 2021-07-24 ENCOUNTER — OUTPATIENT (OUTPATIENT)
Dept: OUTPATIENT SERVICES | Facility: HOSPITAL | Age: 51
LOS: 1 days | Discharge: HOME | End: 2021-07-24
Payer: COMMERCIAL

## 2021-07-24 DIAGNOSIS — D25.9 LEIOMYOMA OF UTERUS, UNSPECIFIED: Chronic | ICD-10-CM

## 2021-07-24 DIAGNOSIS — Z90.49 ACQUIRED ABSENCE OF OTHER SPECIFIED PARTS OF DIGESTIVE TRACT: Chronic | ICD-10-CM

## 2021-07-24 DIAGNOSIS — Z12.31 ENCOUNTER FOR SCREENING MAMMOGRAM FOR MALIGNANT NEOPLASM OF BREAST: ICD-10-CM

## 2021-07-24 PROCEDURE — 77063 BREAST TOMOSYNTHESIS BI: CPT | Mod: 26

## 2021-07-24 PROCEDURE — 77067 SCR MAMMO BI INCL CAD: CPT | Mod: 26

## 2023-04-22 NOTE — ED ADULT NURSE NOTE - NURSING NEURO ORIENTATION
STATUS POST:  Robot-assisted laparoscopic partial cholecystectomy        POST OPERATIVE DAY #: 1    SUBJECTIVE: Pt seen and examined by chief resident. Pt is doing well, resting comfortably on bed. Had some pain in AM but now better controlled with pain meds. Had some mild nausea with clears last night. No emesis. Nausea improved this AM. Not yet ambulating out of bed.     Vital Signs Last 24 Hrs  T(C): 37.1 (21 Apr 2023 06:01), Max: 37.1 (21 Apr 2023 06:01)  T(F): 98.7 (21 Apr 2023 06:01), Max: 98.7 (21 Apr 2023 06:01)  HR: 62 (21 Apr 2023 06:01) (61 - 81)  BP: 128/82 (21 Apr 2023 06:01) (105/56 - 128/82)  BP(mean): 97 (21 Apr 2023 06:01) (76 - 97)  RR: 17 (21 Apr 2023 06:01) (15 - 28)  SpO2: 95% (21 Apr 2023 06:01) (92% - 95%)    Parameters below as of 21 Apr 2023 06:01  Patient On (Oxygen Delivery Method): room air        I&O's Summary    20 Apr 2023 07:01  -  21 Apr 2023 07:00  --------------------------------------------------------  IN: 3289 mL / OUT: 1115 mL / NET: 2174 mL        Physical Exam:  General Appearance: Appears well, NAD  Pulmonary: Nonlabored breathing, no respiratory distress  Abdomen: Soft, nondisteded, appropriate incisional tenderness, incisions clean and dry and intact, LUCITA serosanguinous   Extremities: WWP, SCD's in place     LABS:                        13.6   11.91 )-----------( 400      ( 20 Apr 2023 12:45 )             41.8     04-20    138  |  103  |  8   ----------------------------<  144<H>  4.1   |  22  |  0.77    Ca    8.9      20 Apr 2023 12:45  Phos  3.3     04-20  Mg     2.0     04-20    TPro  7.0  /  Alb  3.8  /  TBili  0.3  /  DBili  x   /  AST  37  /  ALT  23  /  AlkPhos  71  04-20         OVERNIGHT EVENTS: None    SUBJECTIVE / INTERVAL HPI: Patient seen and examined at bedside. Feeling well, Tolerating diet. Cleared for discharge by surgery    VITAL SIGNS:  Vital Signs Last 24 Hrs  T(C): 36.7 (22 Apr 2023 11:13), Max: 37.2 (21 Apr 2023 18:32)  T(F): 98 (22 Apr 2023 11:13), Max: 99 (21 Apr 2023 18:32)  HR: 76 (22 Apr 2023 11:13) (63 - 82)  BP: 113/76 (22 Apr 2023 11:13) (108/75 - 130/82)  BP(mean): 86 (21 Apr 2023 20:30) (86 - 86)  RR: 17 (22 Apr 2023 11:13) (17 - 18)  SpO2: 95% (22 Apr 2023 11:13) (94% - 96%)    Parameters below as of 22 Apr 2023 11:13  Patient On (Oxygen Delivery Method): room air        04-21-23 @ 07:01  -  04-22-23 @ 07:00  --------------------------------------------------------  IN: 1280 mL / OUT: 1620 mL / NET: -340 mL        PHYSICAL EXAM:    General: WDWN, obese female resting comfortably in bed  HEENT: NC/AT; PERRL, anicteric sclera; MMM  Neck: supple  Cardiovascular: +S1/S2; RRR  Respiratory: CTA B/L; no W/R/R  Gastrointestinal: soft, ND, incision ttp, c/d/i  Extremities: WWP; no edema, clubbing or cyanosis  Vascular: 2+ radial, DP/PT pulses B/L  Neurological: AAOx3; no focal deficits    MEDICATIONS:  MEDICATIONS  (STANDING):  heparin   Injectable 7500 Unit(s) SubCutaneous every 8 hours  piperacillin/tazobactam IVPB.. 3.375 Gram(s) IV Intermittent every 8 hours    MEDICATIONS  (PRN):  ondansetron Injectable 4 milliGRAM(s) IV Push every 6 hours PRN Nausea and/or Vomiting  oxyCODONE    IR 5 milliGRAM(s) Oral every 6 hours PRN Moderate Pain (4 - 6)  oxyCODONE    IR 10 milliGRAM(s) Oral every 6 hours PRN Severe Pain (7 - 10)      ALLERGIES:  Allergies    No Known Allergies    Intolerances        LABS:                        11.6   9.97  )-----------( 317      ( 22 Apr 2023 05:30 )             37.2     04-22    139  |  105  |  6<L>  ----------------------------<  99  4.0   |  25  |  0.95    Ca    8.4      22 Apr 2023 05:30  Phos  2.9     04-22  Mg     2.0     04-22    TPro  6.2  /  Alb  3.3  /  TBili  0.4  /  DBili  x   /  AST  21  /  ALT  14  /  AlkPhos  58  04-21        CAPILLARY BLOOD GLUCOSE            RADIOLOGY & ADDITIONAL TESTS: Reviewed.    ASSESSMENT:    PLAN:  STATUS POST:  4/20: RA partial cholecystomy  POST OPERATIVE DAY #: 2      SUBJECTIVE: Pt seen and examined at bedside this am by surgery team. Tolerating low fat diet without nausea or vomiting. Pain well controlled. Denies f/n/v/cp/sob.    Vital Signs Last 24 Hrs  T(C): 36.9 (22 Apr 2023 06:12), Max: 37.2 (21 Apr 2023 18:32)  T(F): 98.5 (22 Apr 2023 06:12), Max: 99 (21 Apr 2023 18:32)  HR: 65 (22 Apr 2023 06:12) (63 - 82)  BP: 121/79 (22 Apr 2023 06:12) (108/75 - 130/82)  BP(mean): 86 (21 Apr 2023 20:30) (86 - 86)  RR: 17 (22 Apr 2023 06:12) (17 - 18)  SpO2: 96% (22 Apr 2023 06:12) (94% - 96%)    Parameters below as of 22 Apr 2023 06:12  Patient On (Oxygen Delivery Method): room air        PHYSICAL EXAM:   Gen: Awake, alert, NAD, resting comfortably   CV: RRR  Pulm: no respiratory distress on RA  Abd: soft, ND, appropriate incisional TTP, no rebound or guarding, incisions c/d/i    Ext: WWP    I&O's Detail    21 Apr 2023 07:01  -  22 Apr 2023 07:00  --------------------------------------------------------  IN:    dextrose 5% + sodium chloride 0.45%: 560 mL    Oral Fluid: 720 mL  Total IN: 1280 mL    OUT:    Bulb (mL): 20 mL    Voided (mL): 1600 mL  Total OUT: 1620 mL    Total NET: -340 mL          LABS:                        11.6   9.97  )-----------( 317      ( 22 Apr 2023 05:30 )             37.2     04-22    139  |  105  |  6<L>  ----------------------------<  99  4.0   |  25  |  0.95    Ca    8.4      22 Apr 2023 05:30  Phos  2.9     04-22  Mg     2.0     04-22    TPro  6.2  /  Alb  3.3  /  TBili  0.4  /  DBili  x   /  AST  21  /  ALT  14  /  AlkPhos  58  04-21    LIVER FUNCTIONS - ( 21 Apr 2023 07:25 )  Alb: 3.3 g/dL / Pro: 6.2 g/dL / ALK PHOS: 58 U/L / ALT: 14 U/L / AST: 21 U/L / GGT: x             CAPILLARY BLOOD GLUCOSE          RADIOLOGY & ADDITIONAL STUDIES: oriented to person, place and time

## 2025-05-06 NOTE — PATIENT PROFILE ADULT - [id number]_GEN_A_NUR
L/W lab requesting ionized calcium order changed to external.  Currently put in for internal.  Nellie Diehl pt   /